# Patient Record
Sex: MALE | Employment: UNEMPLOYED | URBAN - METROPOLITAN AREA
[De-identification: names, ages, dates, MRNs, and addresses within clinical notes are randomized per-mention and may not be internally consistent; named-entity substitution may affect disease eponyms.]

---

## 2023-08-09 ENCOUNTER — OFFICE VISIT (OUTPATIENT)
Dept: SURGERY | Age: 48
End: 2023-08-09
Payer: OTHER GOVERNMENT

## 2023-08-09 VITALS
HEART RATE: 79 BPM | WEIGHT: 160 LBS | BODY MASS INDEX: 25.11 KG/M2 | OXYGEN SATURATION: 100 % | DIASTOLIC BLOOD PRESSURE: 80 MMHG | SYSTOLIC BLOOD PRESSURE: 124 MMHG | HEIGHT: 67 IN

## 2023-08-09 DIAGNOSIS — M54.42 CHRONIC LEFT-SIDED LOW BACK PAIN WITH LEFT-SIDED SCIATICA: ICD-10-CM

## 2023-08-09 DIAGNOSIS — R10.32 LEFT GROIN PAIN: ICD-10-CM

## 2023-08-09 DIAGNOSIS — G89.29 CHRONIC LEFT-SIDED LOW BACK PAIN WITH LEFT-SIDED SCIATICA: ICD-10-CM

## 2023-08-09 PROCEDURE — 99204 OFFICE O/P NEW MOD 45 MIN: CPT | Performed by: STUDENT IN AN ORGANIZED HEALTH CARE EDUCATION/TRAINING PROGRAM

## 2023-08-09 RX ORDER — CYCLOBENZAPRINE HCL 10 MG
TABLET ORAL
COMMUNITY
Start: 2021-08-19

## 2023-08-09 RX ORDER — BUPROPION HYDROCHLORIDE 150 MG/1
1 TABLET, EXTENDED RELEASE ORAL DAILY
Qty: 30 TABLET | Refills: 9 | COMMUNITY
Start: 2022-12-14 | End: 2023-10-08

## 2023-08-09 RX ORDER — ESCITALOPRAM OXALATE 20 MG/1
TABLET ORAL
COMMUNITY
Start: 2022-12-27 | End: 2023-12-28

## 2023-08-09 RX ORDER — CELECOXIB 100 MG/1
CAPSULE ORAL
COMMUNITY
Start: 2021-10-13

## 2023-08-09 RX ORDER — AMOXICILLIN 500 MG/1
TABLET, FILM COATED ORAL
COMMUNITY
Start: 2022-02-26

## 2023-08-09 RX ORDER — ARIPIPRAZOLE 5 MG/1
TABLET ORAL
COMMUNITY
Start: 2021-10-11

## 2023-08-09 RX ORDER — ATORVASTATIN CALCIUM 20 MG/1
TABLET, FILM COATED ORAL
COMMUNITY
Start: 2022-06-10

## 2023-08-09 RX ORDER — ALPRAZOLAM 0.5 MG/1
TABLET ORAL
COMMUNITY
Start: 2022-04-01

## 2023-08-29 ENCOUNTER — HOSPITAL ENCOUNTER (OUTPATIENT)
Dept: MRI IMAGING | Age: 48
Discharge: HOME | End: 2023-09-01
Attending: STUDENT IN AN ORGANIZED HEALTH CARE EDUCATION/TRAINING PROGRAM
Payer: OTHER GOVERNMENT

## 2023-08-29 DIAGNOSIS — G89.29 CHRONIC LEFT-SIDED LOW BACK PAIN WITH LEFT-SIDED SCIATICA: ICD-10-CM

## 2023-08-29 DIAGNOSIS — R10.32 LEFT GROIN PAIN: ICD-10-CM

## 2023-08-29 DIAGNOSIS — M54.42 CHRONIC LEFT-SIDED LOW BACK PAIN WITH LEFT-SIDED SCIATICA: ICD-10-CM

## 2023-08-29 PROCEDURE — A9579 GAD-BASE MR CONTRAST NOS,1ML: HCPCS | Performed by: STUDENT IN AN ORGANIZED HEALTH CARE EDUCATION/TRAINING PROGRAM

## 2023-08-29 PROCEDURE — 72197 MRI PELVIS W/O & W/DYE: CPT

## 2023-08-29 PROCEDURE — 6360000004 HC RX CONTRAST MEDICATION: Performed by: STUDENT IN AN ORGANIZED HEALTH CARE EDUCATION/TRAINING PROGRAM

## 2023-08-29 PROCEDURE — 2580000003 HC RX 258: Performed by: STUDENT IN AN ORGANIZED HEALTH CARE EDUCATION/TRAINING PROGRAM

## 2023-08-29 PROCEDURE — 72158 MRI LUMBAR SPINE W/O & W/DYE: CPT

## 2023-08-29 RX ORDER — SODIUM CHLORIDE 0.9 % (FLUSH) 0.9 %
10 SYRINGE (ML) INJECTION AS NEEDED
Status: DISCONTINUED | OUTPATIENT
Start: 2023-08-29 | End: 2023-09-02 | Stop reason: HOSPADM

## 2023-08-29 RX ADMIN — SODIUM CHLORIDE, PRESERVATIVE FREE 10 ML: 5 INJECTION INTRAVENOUS at 20:58

## 2023-08-29 RX ADMIN — GADOTERIDOL 14 ML: 279.3 INJECTION, SOLUTION INTRAVENOUS at 20:58

## 2023-08-31 DIAGNOSIS — M51.16 LUMBAR DISC DISEASE WITH RADICULOPATHY: Primary | ICD-10-CM

## 2023-08-31 NOTE — PROGRESS NOTES
Pt's MRI lumbar results discussed with the patient.  Will refer to spinal surgeon Dr. Pasquale Zamudio

## 2023-09-21 ENCOUNTER — OFFICE VISIT (OUTPATIENT)
Dept: ORTHOPEDIC SURGERY | Age: 48
End: 2023-09-21
Payer: OTHER GOVERNMENT

## 2023-09-21 VITALS — HEIGHT: 67 IN | BODY MASS INDEX: 25.99 KG/M2 | WEIGHT: 165.6 LBS

## 2023-09-21 DIAGNOSIS — M54.16 LUMBAR RADICULOPATHY: ICD-10-CM

## 2023-09-21 DIAGNOSIS — M51.36 LUMBAR DEGENERATIVE DISC DISEASE: ICD-10-CM

## 2023-09-21 DIAGNOSIS — M48.061 LUMBAR FORAMINAL STENOSIS: ICD-10-CM

## 2023-09-21 DIAGNOSIS — M54.50 LOW BACK PAIN, UNSPECIFIED BACK PAIN LATERALITY, UNSPECIFIED CHRONICITY, UNSPECIFIED WHETHER SCIATICA PRESENT: Primary | ICD-10-CM

## 2023-09-21 PROCEDURE — 99203 OFFICE O/P NEW LOW 30 MIN: CPT | Performed by: NURSE PRACTITIONER

## 2023-09-21 RX ORDER — ROSUVASTATIN CALCIUM 20 MG/1
TABLET, COATED ORAL
COMMUNITY
Start: 2021-08-24

## 2023-09-21 RX ORDER — ATORVASTATIN CALCIUM 40 MG/1
TABLET, FILM COATED ORAL
COMMUNITY
Start: 2023-09-08

## 2023-09-21 RX ORDER — METHOCARBAMOL 750 MG/1
TABLET, FILM COATED ORAL
COMMUNITY
Start: 2023-05-23 | End: 2024-05-23

## 2023-09-21 RX ORDER — RIZATRIPTAN BENZOATE 10 MG/1
TABLET, ORALLY DISINTEGRATING ORAL
COMMUNITY
Start: 2021-08-19 | End: 2023-11-11

## 2023-09-21 RX ORDER — TOPIRAMATE 25 MG/1
TABLET ORAL
COMMUNITY
Start: 2023-09-08 | End: 2024-09-08

## 2023-09-21 RX ORDER — GABAPENTIN 100 MG/1
100 CAPSULE ORAL
COMMUNITY
Start: 2023-07-31

## 2023-09-21 RX ORDER — IBUPROFEN 600 MG/1
TABLET ORAL
COMMUNITY
Start: 2021-08-19

## 2023-09-21 RX ORDER — LIDOCAINE 50 MG/G
PATCH TOPICAL
COMMUNITY
Start: 2021-08-19

## 2023-09-21 RX ORDER — DICLOFENAC SODIUM 75 MG/1
TABLET, DELAYED RELEASE ORAL
COMMUNITY
Start: 2021-11-29

## 2023-09-21 RX ORDER — BUSPIRONE HYDROCHLORIDE 15 MG/1
TABLET ORAL
COMMUNITY
Start: 2022-04-01 | End: 2024-03-22

## 2023-09-21 RX ORDER — FAMOTIDINE 20 MG/1
TABLET, FILM COATED ORAL
COMMUNITY
Start: 2022-07-08

## 2023-09-21 RX ORDER — TAMSULOSIN HYDROCHLORIDE 0.4 MG/1
CAPSULE ORAL
COMMUNITY
Start: 2022-08-03

## 2023-09-21 RX ORDER — FLUOXETINE HYDROCHLORIDE 40 MG/1
40 CAPSULE ORAL
COMMUNITY
Start: 2023-07-31

## 2023-09-21 RX ORDER — CETIRIZINE HYDROCHLORIDE 10 MG/1
TABLET ORAL
COMMUNITY
Start: 2023-05-23 | End: 2024-05-23

## 2023-09-21 RX ORDER — CLOTRIMAZOLE 1 %
CREAM (GRAM) TOPICAL
COMMUNITY
Start: 2023-09-08 | End: 2024-09-08

## 2023-09-21 RX ORDER — ACETAMINOPHEN 325 MG/1
TABLET ORAL
COMMUNITY
Start: 2023-08-21 | End: 2024-09-08

## 2023-09-21 RX ORDER — ZOLPIDEM TARTRATE 10 MG/1
TABLET ORAL
COMMUNITY
Start: 2021-08-19

## 2023-09-21 RX ORDER — FENOFIBRATE 145 MG/1
TABLET, COATED ORAL
COMMUNITY
Start: 2023-09-08

## 2023-09-21 NOTE — PROGRESS NOTES
AFFECTED AREA TWICE A DAY AS NEEDED ---EXTERNAL USE ONLY, Disp: , Rfl:     FLUoxetine (PROZAC) 40 MG capsule, 1 capsule, Disp: , Rfl:     gabapentin (NEURONTIN) 100 MG capsule, 1 capsule., Disp: , Rfl:     lidocaine (LIDODERM) 5 %, lidocaine 5% topical film Start Date: 8/26/21 Status: Ordered, Disp: , Rfl:     methocarbamol (ROBAXIN) 750 MG tablet, TAKE ONE TABLET BY MOUTH EVERY 8 HOURS AS NEEDED FOR MUSCLE SPASM *REPLACES BACLOFEN*, Disp: , Rfl:     rizatriptan (MAXALT-MLT) 10 MG disintegrating tablet, DISSOLVE 1 TABLET BY MOUTH ON THE TONGUE ONE TIME AT ONSET OF HEADACHE, MAY REPEAT IN 2 HOURS IF NO RELIEF *SWALLOW DISSOLVED TABLET WITH THE SALIVA*, Disp: , Rfl:     rosuvastatin (CRESTOR) 20 MG tablet, rosuvastatin 20 mg oral tablet Start Date: 8/29/21 Status: Ordered, Disp: , Rfl:     topiramate (TOPAMAX) 25 MG tablet, TAKE ONE TABLET BY MOUTH AT BEDTIME FOR 1 WEEK, THEN TAKE ONE TABLET TWICE A DAY FOR MIGRAINE PREVENTION, Disp: , Rfl:     tamsulosin (FLOMAX) 0.4 MG capsule, , Disp: , Rfl:     zolpidem (AMBIEN) 10 MG tablet, , Disp: , Rfl:     acetaminophen (TYLENOL) 325 MG tablet, TAKE TWO TABLETS BY MOUTH EVERY 8 HOURS AS NEEDED FOR FEVER AND PAIN *MAX 4000MG ACETAMINOPHEN PER DAY*, Disp: , Rfl:     diclofenac (VOLTAREN) 75 MG EC tablet, diclofenac sodium 75 mg oral delayed release tablet Start Date: 11/29/21 Status: Ordered, Disp: , Rfl:     famotidine (PEPCID) 20 MG tablet, , Disp: , Rfl:     ibuprofen (ADVIL;MOTRIN) 600 MG tablet, ibuprofen 600 mg oral tablet Start Date: 8/26/21 Status: Ordered, Disp: , Rfl:     ALPRAZolam (XANAX) 0.5 MG tablet, ALPRAZolam 0.5 mg oral tablet Start Date: 4/1/22 Status: Ordered, Disp: , Rfl:     amoxicillin (AMOXIL) 500 MG tablet,  0 total refill(s), Disp: , Rfl:     ARIPiprazole (ABILIFY) 5 MG tablet, ARIPiprazole 5 mg oral tablet Start Date: 10/14/21 Status: Ordered, Disp: , Rfl:     atorvastatin (LIPITOR) 20 MG tablet, , Disp: , Rfl:     buPROPion (WELLBUTRIN SR) 150 MG

## 2023-10-25 ENCOUNTER — EVALUATION (OUTPATIENT)
Age: 48
End: 2023-10-25
Payer: OTHER GOVERNMENT

## 2023-10-25 DIAGNOSIS — M54.16 LUMBAR RADICULOPATHY: ICD-10-CM

## 2023-10-25 DIAGNOSIS — G89.29 CHRONIC LEFT-SIDED LOW BACK PAIN WITHOUT SCIATICA: Primary | ICD-10-CM

## 2023-10-25 DIAGNOSIS — M25.60 JOINT STIFFNESS OF SPINE: ICD-10-CM

## 2023-10-25 DIAGNOSIS — M25.652 DECREASED RANGE OF MOTION OF BOTH HIPS: ICD-10-CM

## 2023-10-25 DIAGNOSIS — M25.852 HIP IMPINGEMENT SYNDROME, LEFT: ICD-10-CM

## 2023-10-25 DIAGNOSIS — M25.552 PAIN OF LEFT HIP: ICD-10-CM

## 2023-10-25 DIAGNOSIS — M25.651 DECREASED RANGE OF MOTION OF BOTH HIPS: ICD-10-CM

## 2023-10-25 DIAGNOSIS — Z74.09 DECREASED FUNCTIONAL MOBILITY AND ENDURANCE: ICD-10-CM

## 2023-10-25 DIAGNOSIS — M54.50 CHRONIC LEFT-SIDED LOW BACK PAIN WITHOUT SCIATICA: Primary | ICD-10-CM

## 2023-10-25 PROCEDURE — 97162 PT EVAL MOD COMPLEX 30 MIN: CPT

## 2023-10-25 PROCEDURE — 97140 MANUAL THERAPY 1/> REGIONS: CPT

## 2023-10-25 PROCEDURE — 97110 THERAPEUTIC EXERCISES: CPT

## 2023-10-25 NOTE — PROGRESS NOTES
limitations/modifications  Restricted recreational participation  Difficulty sleeping    Clinical decision making: moderate complexity with questionable prediction of expectations and future outcomes which may require adjustments to the POC. Prognosis: excellent   Benefits and precautions of treatment explained to patient. Jamarcus Treviño is a 57 y.o. male who presents to therapy today with evolving/changing clinical presentation (moderate complexity)  related to L side low back and hip pain. Patient demonstrates severe objective and functional deficits with instability: constant or intense symptoms with severe loss of ROM, strength, or ADLs  Patient is appropriate for Rehabilitation Management per the LBP Treatment Based Classification, with initial focus on Symptom Modulation and Movement Control. Pt would benefit from skilled physical therapy services to address the deficits noted above for return to prior level of function. PLAN OF CARE     Effective Dates: 10/25/2023 TO 1/24/2024 (90 days). Frequency/Duration: 2x/week for 60 Day(s)  Interventions may include but are not limited to: (76527) Therapeutic exercise to develop ROM, strength, endurance and flexibility  (52557) Therapeutic activities using dynamic activities to improve function  (61482) Manual therapy techniques to improve joint and/or soft tissue mobility, ROM, and function as well as helping to decrease pain/spasms and swelling  (54942/64273) Dry needling for the management of neuromusculoskeletal pain and movement impairment  Home exercise program (HEP) development  Modalities prn to address pain, spasms, and swelling: (64642) Electrical stimulation - attended    The referring physician has reviewed and approved this evaluation and plan of care as noted by the electronic signature attached to note.     GOALS     Short term goals to be met by 11/22/2023  (4 weeks):  Patient will demonstrate good recall of HEP requiring no more than minimal

## 2023-10-30 ENCOUNTER — TREATMENT (OUTPATIENT)
Age: 48
End: 2023-10-30
Payer: OTHER GOVERNMENT

## 2023-10-30 DIAGNOSIS — M25.651 DECREASED RANGE OF MOTION OF BOTH HIPS: ICD-10-CM

## 2023-10-30 DIAGNOSIS — M25.852 HIP IMPINGEMENT SYNDROME, LEFT: ICD-10-CM

## 2023-10-30 DIAGNOSIS — Z74.09 DECREASED FUNCTIONAL MOBILITY AND ENDURANCE: ICD-10-CM

## 2023-10-30 DIAGNOSIS — M25.652 DECREASED RANGE OF MOTION OF BOTH HIPS: ICD-10-CM

## 2023-10-30 DIAGNOSIS — M25.60 JOINT STIFFNESS OF SPINE: ICD-10-CM

## 2023-10-30 DIAGNOSIS — M25.552 PAIN OF LEFT HIP: ICD-10-CM

## 2023-10-30 DIAGNOSIS — M54.16 LUMBAR RADICULOPATHY: ICD-10-CM

## 2023-10-30 DIAGNOSIS — G89.29 CHRONIC LEFT-SIDED LOW BACK PAIN WITHOUT SCIATICA: Primary | ICD-10-CM

## 2023-10-30 DIAGNOSIS — M54.50 CHRONIC LEFT-SIDED LOW BACK PAIN WITHOUT SCIATICA: Primary | ICD-10-CM

## 2023-10-30 PROCEDURE — 97140 MANUAL THERAPY 1/> REGIONS: CPT

## 2023-10-30 PROCEDURE — 97110 THERAPEUTIC EXERCISES: CPT

## 2023-10-30 NOTE — PROGRESS NOTES
[facility]  [address] [place] [address]  Dept: [phone]      Physical Therapy Daily Note     \"Avery\"    Referring MD: DENILSON Fernandez*  Diagnosis:     ICD-10-CM    1. Chronic left-sided low back pain without sciatica  M54.50     G89.29       2. Lumbar radiculopathy  M54.16       3. Pain of left hip  M25.552       4. Hip impingement syndrome, left  M25.852       5. Joint stiffness of spine  M25.60       6. Decreased range of motion of both hips  M25.651     M25.652       7. Decreased functional mobility and endurance  Z74.09          Surgery: n/a    Therapy precautions:None  Co-morbidities affecting plan of care: Hx of B hernia repair, HTN    PERTINENT MEDICAL HISTORY     Past medical and surgical history:   No past medical history on file. Past Surgical History:   Procedure Laterality Date    APPENDECTOMY      HERNIA REPAIR Bilateral     right hernia repaired 2 times, left inguinal hernia     Medications: reviewed in chart   Allergies: No Known Allergies     Chief complaints/history of injury: Patient reports long standing history of low back pain, with an original injury occurring when falling from helicopter in 1299. He notes his symptoms are mostly manageable, but he felt a tweak in the back when weightlifting in March while performing a UUE lower body lift. He has increased pain with paresthesia to the knee, occasionally to the foot, with flexed postures, especially with sitting and stair management. Date symptoms began: March 2023  Nature of condition: Recurrent (multiple episodes of < 3 months)  Primary cause of current episode: Repetitive  How did symptoms start: see above  Describe current symptoms: Pain at L LSJ, isolated in groin, paresthesia with flexed postures    Received previous therapy?  Yes; Number of therapy visits in the last 90 days: 0    Pain Assessment:  Pain location: L LSJ and L groin    Current (0-10 pain scale): 6/10  Average Pain/symptom

## 2023-11-08 ENCOUNTER — TREATMENT (OUTPATIENT)
Age: 48
End: 2023-11-08

## 2023-11-08 DIAGNOSIS — M25.60 JOINT STIFFNESS OF SPINE: ICD-10-CM

## 2023-11-08 DIAGNOSIS — M54.50 CHRONIC LEFT-SIDED LOW BACK PAIN WITHOUT SCIATICA: Primary | ICD-10-CM

## 2023-11-08 DIAGNOSIS — M25.651 DECREASED RANGE OF MOTION OF BOTH HIPS: ICD-10-CM

## 2023-11-08 DIAGNOSIS — M25.552 PAIN OF LEFT HIP: ICD-10-CM

## 2023-11-08 DIAGNOSIS — Z74.09 DECREASED FUNCTIONAL MOBILITY AND ENDURANCE: ICD-10-CM

## 2023-11-08 DIAGNOSIS — M25.652 DECREASED RANGE OF MOTION OF BOTH HIPS: ICD-10-CM

## 2023-11-08 DIAGNOSIS — M54.16 LUMBAR RADICULOPATHY: ICD-10-CM

## 2023-11-08 DIAGNOSIS — M25.852 HIP IMPINGEMENT SYNDROME, LEFT: ICD-10-CM

## 2023-11-08 DIAGNOSIS — G89.29 CHRONIC LEFT-SIDED LOW BACK PAIN WITHOUT SCIATICA: Primary | ICD-10-CM

## 2023-11-08 NOTE — PROGRESS NOTES
[facility]  [address] [place] [address]  Dept: [phone]      Physical Therapy Daily Note     \"Avery\"    Referring MD: DENILSON Corbett*  Diagnosis:     ICD-10-CM    1. Chronic left-sided low back pain without sciatica  M54.50     G89.29       2. Lumbar radiculopathy  M54.16       3. Pain of left hip  M25.552       4. Hip impingement syndrome, left  M25.852       5. Joint stiffness of spine  M25.60       6. Decreased range of motion of both hips  M25.651     M25.652       7. Decreased functional mobility and endurance  Z74.09          Surgery: n/a    Therapy precautions:None  Co-morbidities affecting plan of care: Hx of B hernia repair, HTN    PERTINENT MEDICAL HISTORY     Past medical and surgical history:   No past medical history on file. Past Surgical History:   Procedure Laterality Date    APPENDECTOMY      HERNIA REPAIR Bilateral     right hernia repaired 2 times, left inguinal hernia     Medications: reviewed in chart   Allergies: No Known Allergies     Chief complaints/history of injury: Patient reports long standing history of low back pain, with an original injury occurring when falling from helicopter in [facility]. He notes his symptoms are mostly manageable, but he felt a tweak in the back when weightlifting in March while performing a UUE lower body lift. He has increased pain with paresthesia to the knee, occasionally to the foot, with flexed postures, especially with sitting and stair management. Date symptoms began: March 2023  Nature of condition: Recurrent (multiple episodes of < 3 months)  Primary cause of current episode: Repetitive  How did symptoms start: see above  Describe current symptoms: Pain at L LSJ, isolated in groin, paresthesia with flexed postures    Received previous therapy?  Yes; Number of therapy visits in the last 90 days: 0    Pain Assessment:  Pain location: L LSJ and L groin    Current (0-10 pain scale): 6/10  Average Pain/symptom

## 2023-11-14 ENCOUNTER — TREATMENT (OUTPATIENT)
Age: 48
End: 2023-11-14
Payer: OTHER GOVERNMENT

## 2023-11-14 DIAGNOSIS — M54.16 LUMBAR RADICULOPATHY: ICD-10-CM

## 2023-11-14 DIAGNOSIS — M25.552 PAIN OF LEFT HIP: ICD-10-CM

## 2023-11-14 DIAGNOSIS — G89.29 CHRONIC LEFT-SIDED LOW BACK PAIN WITHOUT SCIATICA: Primary | ICD-10-CM

## 2023-11-14 DIAGNOSIS — M25.852 HIP IMPINGEMENT SYNDROME, LEFT: ICD-10-CM

## 2023-11-14 DIAGNOSIS — Z74.09 DECREASED FUNCTIONAL MOBILITY AND ENDURANCE: ICD-10-CM

## 2023-11-14 DIAGNOSIS — M25.652 DECREASED RANGE OF MOTION OF BOTH HIPS: ICD-10-CM

## 2023-11-14 DIAGNOSIS — M25.60 JOINT STIFFNESS OF SPINE: ICD-10-CM

## 2023-11-14 DIAGNOSIS — M54.50 CHRONIC LEFT-SIDED LOW BACK PAIN WITHOUT SCIATICA: Primary | ICD-10-CM

## 2023-11-14 DIAGNOSIS — M25.651 DECREASED RANGE OF MOTION OF BOTH HIPS: ICD-10-CM

## 2023-11-14 PROCEDURE — 97530 THERAPEUTIC ACTIVITIES: CPT

## 2023-11-14 PROCEDURE — 97110 THERAPEUTIC EXERCISES: CPT

## 2023-11-14 PROCEDURE — 97140 MANUAL THERAPY 1/> REGIONS: CPT

## 2023-11-14 NOTE — PROGRESS NOTES
dynamic activities to improve function  (13718) Manual therapy techniques to improve joint and/or soft tissue mobility, ROM, and function as well as helping to decrease pain/spasms and swelling  (90673/91623) Dry needling for the management of neuromusculoskeletal pain and movement impairment  Home exercise program (HEP) development  Modalities prn to address pain, spasms, and swelling: (82214) Electrical stimulation - attended    GOALS     Short term goals to be met by 11/22/2023  (4 weeks):  Patient will demonstrate good recall of HEP requiring no more than minimal verbal cuing for proper form and technique. Goal Met 11/8/2023  Pt will report symptoms have centralized to no farther than L trochanter, indicating nerve healing and improving pain levels and muscle activation. Patient to subjectively report a decrease in pain to 3/10 at worst to allow patient to increase function with ADLs. Pt will report pain decreased to intermittent for improved ability to participate in ADLs and exercise activities. Pt will demonstrate full, pain free AROM of lumbar spine to improve ability to perform functional activities. Pt will test negatively on SLR, indicating nerve healing    Long term goals to be met by 12/20/2023  (8 weeks):  Pt will report symptoms have centralized to no farther than LSJ, indicating nerve healing and improving pain levels and muscle activation. Pt will achieve functional hip AROM to perform functional activities and recreation Goal Met 11/14/2023  Pt will increase hip strength with MMT to at least 5/5 for improved load tolerance and recruitment in functional movements. Pt will test negatively for hip pathology, indicating appropriate muscular tone and tolerance of chosen activities. Pt will improve score on the Oswestry Low Back Pain Questionnaire to </= 20 % disability, demonstrating improved overall function. [employer]  Access Code: [email or web address]  URL: [email or web address]  Date:

## 2023-11-22 ENCOUNTER — TREATMENT (OUTPATIENT)
Age: 48
End: 2023-11-22

## 2023-11-22 DIAGNOSIS — Z74.09 DECREASED FUNCTIONAL MOBILITY AND ENDURANCE: ICD-10-CM

## 2023-11-22 DIAGNOSIS — M54.16 LUMBAR RADICULOPATHY: ICD-10-CM

## 2023-11-22 DIAGNOSIS — M25.652 DECREASED RANGE OF MOTION OF BOTH HIPS: ICD-10-CM

## 2023-11-22 DIAGNOSIS — M25.651 DECREASED RANGE OF MOTION OF BOTH HIPS: ICD-10-CM

## 2023-11-22 DIAGNOSIS — M25.60 JOINT STIFFNESS OF SPINE: ICD-10-CM

## 2023-11-22 DIAGNOSIS — M54.50 CHRONIC LEFT-SIDED LOW BACK PAIN WITHOUT SCIATICA: Primary | ICD-10-CM

## 2023-11-22 DIAGNOSIS — M25.552 PAIN OF LEFT HIP: ICD-10-CM

## 2023-11-22 DIAGNOSIS — M25.852 HIP IMPINGEMENT SYNDROME, LEFT: ICD-10-CM

## 2023-11-22 DIAGNOSIS — G89.29 CHRONIC LEFT-SIDED LOW BACK PAIN WITHOUT SCIATICA: Primary | ICD-10-CM

## 2023-11-22 NOTE — PROGRESS NOTES
points, muscular and connective tissues for the management of neuromusculoskeletal pain and movement impairment   Patient was educated on dry needling treatment, expectations, and post-treatment instructions. Dry needling precautions/contraindications: Reviewed- none noted    Needles size and location: .[id number], L lumbar paraspinals (L1/4)                                             Needle count: 2 needles inserted/ 2 needles removed   Position: prone   Needle  technique left in situ x 10 minutes    Electrical Stimulation Performed with (39935) attended electrical stimulation, Frequency: 45 Hz, and Number of leads: 2   Patient Response: Patient experienced no adverse response to treatment. Pre-Test: See above   Post-Test: Normalized muscular tone, full extension, alleviation of symptoms       Therapeutic activities (77723) x 15 min using dynamic activities to improve function  Squat  Split (Belarusian, TRX long) 2x8 each  Deadlift  Augustin curl (6\" box) 2x10 (25lbs)      Patient Education  Use of TpDN with eStim and expected response  HEP update with functional motions  Briana split squat  Augustin curl    ASSESSMENT     Patient demonstrates:  Progression of lumbar and hip ROM, with min restriction in extension and L sidebending, but no in pain in all planes  Hypertonic lumbar musculature detailed above     He has improvements in:  Extension ROM, muscular tone, and symptom profile s/p TpDN  Load tolerance, with  Progression of functional motions     Continues with:  Deficits in hip mobility, motor control, and functional tolerance  appropriate for Rehabilitation Management per the LBP Treatment Based Classification, with focus on Symptom Modulation and Functional Optimization    PLAN     Continue therapy per POC. Manual treatment for symptom modulation and restoration of hip and thoracolumbar ROM.  Progress activities for engagement of lumbopelvic musculature with integration to functional tasks as

## 2023-11-29 ENCOUNTER — TREATMENT (OUTPATIENT)
Age: 48
End: 2023-11-29
Payer: OTHER GOVERNMENT

## 2023-11-29 DIAGNOSIS — M25.852 HIP IMPINGEMENT SYNDROME, LEFT: ICD-10-CM

## 2023-11-29 DIAGNOSIS — M54.50 CHRONIC LEFT-SIDED LOW BACK PAIN WITHOUT SCIATICA: Primary | ICD-10-CM

## 2023-11-29 DIAGNOSIS — M25.552 PAIN OF LEFT HIP: ICD-10-CM

## 2023-11-29 DIAGNOSIS — Z74.09 DECREASED FUNCTIONAL MOBILITY AND ENDURANCE: ICD-10-CM

## 2023-11-29 DIAGNOSIS — M25.652 DECREASED RANGE OF MOTION OF BOTH HIPS: ICD-10-CM

## 2023-11-29 DIAGNOSIS — M25.60 JOINT STIFFNESS OF SPINE: ICD-10-CM

## 2023-11-29 DIAGNOSIS — G89.29 CHRONIC LEFT-SIDED LOW BACK PAIN WITHOUT SCIATICA: Primary | ICD-10-CM

## 2023-11-29 DIAGNOSIS — M54.16 LUMBAR RADICULOPATHY: ICD-10-CM

## 2023-11-29 DIAGNOSIS — M25.651 DECREASED RANGE OF MOTION OF BOTH HIPS: ICD-10-CM

## 2023-11-29 PROCEDURE — 97032 APPL MODALITY 1+ESTIM EA 15: CPT

## 2023-11-29 PROCEDURE — 20560 NDL INSJ W/O NJX 1 OR 2 MUSC: CPT

## 2023-11-29 PROCEDURE — 97110 THERAPEUTIC EXERCISES: CPT

## 2023-11-29 PROCEDURE — 97140 MANUAL THERAPY 1/> REGIONS: CPT

## 2023-11-29 NOTE — PROGRESS NOTES
box)  Lunge  Lateral      Patient Education  Use of TpDN with eStim and expected response  Progress to date, separation of symptoms  HEP update with functional motions  Lateral step  Lateral lunge    ASSESSMENT     Start of care: 10/25/2023   As of 11/29/2023, Terri Crisostomo has attended 6 PT sessions. Pt's attendance has been consistent with plan of care. Pt has progressed well with treatment. He has met 5/6 short term goals, 1/5 long term goals, has subjective reports of isolation of symptoms to distinct areas at the L LSJ and L anterior hip/groin, but improvement in tolerance movement, with unrestricted ADLs, only mild discomfort with extended duration postures, and return to resistance training for exercise, and has improved functional deficit to 40% per JAZMYNE. Objective findings revealed full and pain free lumbar ROM with only sense of tightness in L anterior hip during R quadrant, progression of strength to 4+/5 or greater with MMT globally, functional hip ROM, negative SLR, but positive medial scour and FADIR on L. Continued deficits include: Pain, ROM limitations, Soft tissue restrictions, Strength deficits, Motor control deficits, Decreased endurance/activity Tolerance, and Restricted recreational participation. Pt demonstrates mild or moderate objective and functional deficits without instability: sporadic symptoms with min to mod loss of ROM, strength, or ADLs. Pt has not achieved max rehab potential and would benefit from continued skilled PT to address the above impairments and continue progress towards remaining therapy goals. Pt has not achieved max rehab potential and would benefit from continued skilled PT to address the above impairments and continue progress towards remaining therapy goals. PLAN     Effective Dates/Duration: 11/29/2023 TO 1/28/2024 (60 days).     Frequency: 1x/week   Interventions may include but are not limited to: (44125) Therapeutic exercise to develop ROM, strength,

## 2023-12-13 ENCOUNTER — CLINICAL DOCUMENTATION (OUTPATIENT)
Age: 48
End: 2023-12-13

## 2023-12-13 NOTE — PROGRESS NOTES
Pt called main number this morning to cancel today's PT appointment. Had \"a procedure\" done at [facility] and is in too much pain to attend PT.

## 2023-12-27 ENCOUNTER — TREATMENT (OUTPATIENT)
Age: 48
End: 2023-12-27
Payer: OTHER GOVERNMENT

## 2023-12-27 DIAGNOSIS — M54.50 CHRONIC LEFT-SIDED LOW BACK PAIN WITHOUT SCIATICA: Primary | ICD-10-CM

## 2023-12-27 DIAGNOSIS — M25.60 JOINT STIFFNESS OF SPINE: ICD-10-CM

## 2023-12-27 DIAGNOSIS — M25.652 DECREASED RANGE OF MOTION OF BOTH HIPS: ICD-10-CM

## 2023-12-27 DIAGNOSIS — M25.651 DECREASED RANGE OF MOTION OF BOTH HIPS: ICD-10-CM

## 2023-12-27 DIAGNOSIS — Z74.09 DECREASED FUNCTIONAL MOBILITY AND ENDURANCE: ICD-10-CM

## 2023-12-27 DIAGNOSIS — M54.16 LUMBAR RADICULOPATHY: ICD-10-CM

## 2023-12-27 DIAGNOSIS — M25.552 PAIN OF LEFT HIP: ICD-10-CM

## 2023-12-27 DIAGNOSIS — G89.29 CHRONIC LEFT-SIDED LOW BACK PAIN WITHOUT SCIATICA: Primary | ICD-10-CM

## 2023-12-27 PROCEDURE — 97140 MANUAL THERAPY 1/> REGIONS: CPT

## 2023-12-27 PROCEDURE — 97530 THERAPEUTIC ACTIVITIES: CPT

## 2023-12-27 PROCEDURE — 97110 THERAPEUTIC EXERCISES: CPT

## 2023-12-27 NOTE — PROGRESS NOTES
[facility]  [address] [place] [address]  Dept: [phone]      Physical Therapy Daily Note     \"Avery\"    Referring MD: DENILSON Jo*  Diagnosis:     ICD-10-CM    1. Chronic left-sided low back pain without sciatica  M54.50     G89.29       2. Pain of left hip  M25.552       3. Joint stiffness of spine  M25.60       4. Decreased range of motion of both hips  M25.651     M25.652       5. Decreased functional mobility and endurance  Z74.09       6. Lumbar radiculopathy  M54.16          Surgery: n/a    Therapy precautions:None  Co-morbidities affecting plan of care: Hx of B hernia repair, HTN    PERTINENT MEDICAL HISTORY     Past medical and surgical history:   No past medical history on file. Past Surgical History:   Procedure Laterality Date    APPENDECTOMY      HERNIA REPAIR Bilateral     right hernia repaired 2 times, left inguinal hernia     Medications: reviewed in chart   Allergies: No Known Allergies     Chief complaints/history of injury: Patient reports long standing history of low back pain, with an original injury occurring when falling from helicopter in [address]6. He notes his symptoms are mostly manageable, but he felt a tweak in the back when weightlifting in March while performing a UUE lower body lift. He has increased pain with paresthesia to the knee, occasionally to the foot, with flexed postures, especially with sitting and stair management. PN (11/29/23)  Lattie Reveal of condition: Chronic (continuous duration > 3 months)  Describe current symptoms: L side lumbar tightness/discomfort, L anterior hip/groin pain    Pain Assessment:  Pain location: L side lumbar, L anterior hip/groin    Average Pain/symptom intensity (0-10 scale)  Last 24 hours: 1/10  Last week (1-7 days): 2/10  How often do you feel symptoms? Frequently (51-75%)    How much have your symptoms interfered with daily activities?  A little bit  How has your condition changed since receiving care

## 2024-01-03 ENCOUNTER — TREATMENT (OUTPATIENT)
Age: 49
End: 2024-01-03

## 2024-01-03 DIAGNOSIS — M25.652 DECREASED RANGE OF MOTION OF BOTH HIPS: ICD-10-CM

## 2024-01-03 DIAGNOSIS — G89.29 CHRONIC LEFT-SIDED LOW BACK PAIN WITHOUT SCIATICA: Primary | ICD-10-CM

## 2024-01-03 DIAGNOSIS — M25.60 JOINT STIFFNESS OF SPINE: ICD-10-CM

## 2024-01-03 DIAGNOSIS — M54.50 CHRONIC LEFT-SIDED LOW BACK PAIN WITHOUT SCIATICA: Primary | ICD-10-CM

## 2024-01-03 DIAGNOSIS — Z74.09 DECREASED FUNCTIONAL MOBILITY AND ENDURANCE: ICD-10-CM

## 2024-01-03 DIAGNOSIS — M25.552 PAIN OF LEFT HIP: ICD-10-CM

## 2024-01-03 DIAGNOSIS — M25.651 DECREASED RANGE OF MOTION OF BOTH HIPS: ICD-10-CM

## 2024-01-03 NOTE — PROGRESS NOTES
[facility]  [address] [facility] [address]  Dept: [phone]      Physical Therapy Progress Report     \"Avery\"    Referring MD: Yoandy Beckett, DENILSON*  Diagnosis:     ICD-10-CM    1. Chronic left-sided low back pain without sciatica  M54.50     G89.29       2. Pain of left hip  M25.552       3. Joint stiffness of spine  M25.60       4. Decreased range of motion of both hips  M25.651     M25.652       5. Decreased functional mobility and endurance  Z74.09          Surgery: n/a    Therapy precautions:None  Co-morbidities affecting plan of care: Hx of B hernia repair, HTN    PERTINENT MEDICAL HISTORY     Past medical and surgical history:   No past medical history on file.   Past Surgical History:   Procedure Laterality Date    APPENDECTOMY      HERNIA REPAIR Bilateral     right hernia repaired 2 times, left inguinal hernia     Medications: reviewed in chart   Allergies: No Known Allergies     Chief complaints/history of injury: Patient reports long standing history of low back pain, with an original injury occurring when falling from helicopter in 2003. He notes his symptoms are mostly manageable, but he felt a tweak in the back when weightlifting in March while performing a UUE lower body lift. He has increased pain with paresthesia to the knee, occasionally to the foot, with flexed postures, especially with sitting and stair management.    PN (11/29/23)  Nature of condition: Chronic (continuous duration > 3 months)  Describe current symptoms: L side lumbar tightness/discomfort, L anterior hip/groin pain    Pain Assessment:  Pain location: L side lumbar, L anterior hip/groin    Average Pain/symptom intensity (0-10 scale)  Last 24 hours: 1/10  Last week (1-7 days): 2/10  How often do you feel symptoms? Frequently (51-75%)    How much have your symptoms interfered with daily activities? A little bit  How has your condition changed since receiving care at this facility? Much

## 2024-01-17 ENCOUNTER — TREATMENT (OUTPATIENT)
Age: 49
End: 2024-01-17
Payer: OTHER GOVERNMENT

## 2024-01-17 DIAGNOSIS — M54.50 CHRONIC LEFT-SIDED LOW BACK PAIN WITHOUT SCIATICA: Primary | ICD-10-CM

## 2024-01-17 DIAGNOSIS — G89.29 CHRONIC LEFT-SIDED LOW BACK PAIN WITHOUT SCIATICA: Primary | ICD-10-CM

## 2024-01-17 DIAGNOSIS — Z74.09 DECREASED FUNCTIONAL MOBILITY AND ENDURANCE: ICD-10-CM

## 2024-01-17 DIAGNOSIS — M25.552 PAIN OF LEFT HIP: ICD-10-CM

## 2024-01-17 DIAGNOSIS — M25.651 DECREASED RANGE OF MOTION OF BOTH HIPS: ICD-10-CM

## 2024-01-17 DIAGNOSIS — M25.60 JOINT STIFFNESS OF SPINE: ICD-10-CM

## 2024-01-17 DIAGNOSIS — M25.652 DECREASED RANGE OF MOTION OF BOTH HIPS: ICD-10-CM

## 2024-01-17 DIAGNOSIS — M25.852 HIP IMPINGEMENT SYNDROME, LEFT: ICD-10-CM

## 2024-01-17 PROCEDURE — 97110 THERAPEUTIC EXERCISES: CPT

## 2024-01-17 PROCEDURE — 97140 MANUAL THERAPY 1/> REGIONS: CPT

## 2024-01-17 NOTE — PROGRESS NOTES
Interventions may include but are not limited to: (43262) Therapeutic exercise to develop ROM, strength, endurance and flexibility  (34433) Therapeutic activities using dynamic activities to improve function  (66750) Manual therapy techniques to improve joint and/or soft tissue mobility, ROM, and function as well as helping to decrease pain/spasms and swelling  (20560/20561) Dry needling for the management of neuromusculoskeletal pain and movement impairment  Home exercise program (HEP) development  Modalities prn to address pain, spasms, and swelling: (08472) Electrical stimulation - attended    GOALS     Short term goals to be met by 11/22/2023  (4 weeks):  Patient will demonstrate good recall of HEP requiring no more than minimal verbal cuing for proper form and technique. Goal Met 11/8/2023  Pt will report symptoms have centralized to no farther than L trochanter, indicating nerve healing and improving pain levels and muscle activation. Goal Met 11/22/2023  Patient to subjectively report a decrease in pain to 3/10 at worst to allow patient to increase function with ADLs. Goal Met 11/29/2023  Pt will report pain decreased to intermittent for improved ability to participate in ADLs and exercise activities. Goal Not Met 11/29/2023 - Continue  Pt will demonstrate full, pain free AROM of lumbar spine to improve ability to perform functional activities.Goal Met 11/29/2023  Pt will test negatively on SLR, indicating nerve healing Goal Met 11/29/2023    Long term goals to be met by 12/20/2023  (8 weeks):  Pt will report symptoms have centralized to no farther than LSJ, indicating nerve healing and improving pain levels and muscle activation. Goal Met 12/20/2023  Pt will achieve functional hip AROM to perform functional activities and recreation Goal Met 11/14/2023    Short term goals to be met by 12/27/2023  (4 weeks):  Pt will report pain decreased to intermittent for improved ability to participate in ADLs and

## 2024-01-24 ENCOUNTER — APPOINTMENT (OUTPATIENT)
Dept: CT IMAGING | Age: 49
End: 2024-01-24
Payer: OTHER GOVERNMENT

## 2024-01-24 ENCOUNTER — HOSPITAL ENCOUNTER (EMERGENCY)
Age: 49
Discharge: HOME | End: 2024-01-24
Payer: OTHER GOVERNMENT

## 2024-01-24 VITALS
HEIGHT: 67 IN | DIASTOLIC BLOOD PRESSURE: 76 MMHG | TEMPERATURE: 97.8 F | HEART RATE: 67 BPM | SYSTOLIC BLOOD PRESSURE: 116 MMHG | RESPIRATION RATE: 16 BRPM | WEIGHT: 165 LBS | BODY MASS INDEX: 25.9 KG/M2 | OXYGEN SATURATION: 100 %

## 2024-01-24 DIAGNOSIS — R10.32 LEFT INGUINAL PAIN: Primary | ICD-10-CM

## 2024-01-24 LAB
ALBUMIN SERPL-MCNC: 4.3 G/DL (ref 3.5–5)
ALBUMIN/GLOB SERPL: 1.2 (ref 0.4–1.6)
ALP SERPL-CCNC: 65 U/L (ref 50–136)
ALT SERPL-CCNC: 88 U/L (ref 12–65)
ANION GAP SERPL CALC-SCNC: 0 MMOL/L (ref 2–11)
AST SERPL-CCNC: 33 U/L (ref 15–37)
BASOPHILS # BLD: 0.1 K/UL (ref 0–0.2)
BASOPHILS NFR BLD: 1 % (ref 0–2)
BILIRUB SERPL-MCNC: 0.7 MG/DL (ref 0.2–1.1)
BUN SERPL-MCNC: 16 MG/DL (ref 6–23)
CALCIUM SERPL-MCNC: 9.7 MG/DL (ref 8.3–10.4)
CHLORIDE SERPL-SCNC: 106 MMOL/L (ref 103–113)
CO2 SERPL-SCNC: 32 MMOL/L (ref 21–32)
CREAT SERPL-MCNC: 1 MG/DL (ref 0.8–1.5)
DIFFERENTIAL METHOD BLD: ABNORMAL
EOSINOPHIL # BLD: 0.2 K/UL (ref 0–0.8)
EOSINOPHIL NFR BLD: 4 % (ref 0.5–7.8)
ERYTHROCYTE [DISTWIDTH] IN BLOOD BY AUTOMATED COUNT: 12.1 % (ref 11.9–14.6)
GLOBULIN SER CALC-MCNC: 3.7 G/DL (ref 2.8–4.5)
GLUCOSE SERPL-MCNC: 104 MG/DL (ref 65–100)
HCT VFR BLD AUTO: 44 % (ref 41.1–50.3)
HGB BLD-MCNC: 14.7 G/DL (ref 13.6–17.2)
IMM GRANULOCYTES # BLD AUTO: 0 K/UL (ref 0–0.5)
IMM GRANULOCYTES NFR BLD AUTO: 0 % (ref 0–5)
LIPASE SERPL-CCNC: 120 U/L (ref 73–393)
LYMPHOCYTES # BLD: 1.1 K/UL (ref 0.5–4.6)
LYMPHOCYTES NFR BLD: 23 % (ref 13–44)
MCH RBC QN AUTO: 29.5 PG (ref 26.1–32.9)
MCHC RBC AUTO-ENTMCNC: 33.4 G/DL (ref 31.4–35)
MCV RBC AUTO: 88.4 FL (ref 82–102)
MONOCYTES # BLD: 0.2 K/UL (ref 0.1–1.3)
MONOCYTES NFR BLD: 4 % (ref 4–12)
NEUTS SEG # BLD: 3.3 K/UL (ref 1.7–8.2)
NEUTS SEG NFR BLD: 68 % (ref 43–78)
NRBC # BLD: 0 K/UL (ref 0–0.2)
PLATELET # BLD AUTO: 226 K/UL (ref 150–450)
PMV BLD AUTO: 9.1 FL (ref 9.4–12.3)
POTASSIUM SERPL-SCNC: 4.7 MMOL/L (ref 3.5–5.1)
PROT SERPL-MCNC: 8 G/DL (ref 6.3–8.2)
RBC # BLD AUTO: 4.98 M/UL (ref 4.23–5.6)
SODIUM SERPL-SCNC: 138 MMOL/L (ref 136–146)
WBC # BLD AUTO: 4.8 K/UL (ref 4.3–11.1)

## 2024-01-24 PROCEDURE — 99285 EMERGENCY DEPT VISIT HI MDM: CPT

## 2024-01-24 PROCEDURE — 6360000004 HC RX CONTRAST MEDICATION: Performed by: STUDENT IN AN ORGANIZED HEALTH CARE EDUCATION/TRAINING PROGRAM

## 2024-01-24 PROCEDURE — 80053 COMPREHEN METABOLIC PANEL: CPT

## 2024-01-24 PROCEDURE — 85025 COMPLETE CBC W/AUTO DIFF WBC: CPT

## 2024-01-24 PROCEDURE — 74177 CT ABD & PELVIS W/CONTRAST: CPT

## 2024-01-24 PROCEDURE — 83690 ASSAY OF LIPASE: CPT

## 2024-01-24 RX ADMIN — IOPAMIDOL 100 ML: 755 INJECTION, SOLUTION INTRAVENOUS at 13:41

## 2024-01-24 ASSESSMENT — ENCOUNTER SYMPTOMS
SHORTNESS OF BREATH: 0
TROUBLE SWALLOWING: 0
COUGH: 0
ABDOMINAL PAIN: 1
VOMITING: 0
PHOTOPHOBIA: 0
FACIAL SWELLING: 0

## 2024-01-24 ASSESSMENT — PAIN DESCRIPTION - LOCATION: LOCATION: ABDOMEN;GROIN

## 2024-01-24 ASSESSMENT — LIFESTYLE VARIABLES
HOW MANY STANDARD DRINKS CONTAINING ALCOHOL DO YOU HAVE ON A TYPICAL DAY: 1 OR 2
HOW OFTEN DO YOU HAVE A DRINK CONTAINING ALCOHOL: MONTHLY OR LESS

## 2024-01-24 ASSESSMENT — PAIN - FUNCTIONAL ASSESSMENT: PAIN_FUNCTIONAL_ASSESSMENT: 0-10

## 2024-01-24 ASSESSMENT — PAIN SCALES - GENERAL: PAINLEVEL_OUTOF10: 7

## 2024-01-24 NOTE — ED TRIAGE NOTES
Patient presents ambulatory to triage with c/o abdominal and groin pain.  Patient states that he went to the doctor earlier for his herniated disc who referred him to ER for possible kidney stone r/t pain. Pt has copy of x-ray on his phone which MD was concerned may show possible kidney stone.

## 2024-01-24 NOTE — ED NOTES
I have reviewed discharge instructions with the patient and spouse.  The patient and spouse verbalized understanding.    Patient left ED via Discharge Method: ambulatory to Home with spouse.    Opportunity for questions and clarification provided.       Patient given 0 scripts.         To continue your aftercare when you leave the hospital, you may receive an automated call from our care team to check in on how you are doing.  This is a free service and part of our promise to provide the best care and service to meet your aftercare needs.” If you have questions, or wish to unsubscribe from this service please call [phone].  Thank you for Choosing our [facility] Emergency Department.

## 2024-01-24 NOTE — DISCHARGE INSTRUCTIONS
Your CT scan today showed multiple phleboliths which was likely what your doctor saw on x-ray.  These do not cause pain.  Please continue [facility] with [facility] regarding your [facility].  Return here for new or symptoms worsening symptoms.    As we discussed, I did not find a life threatening cause of your symptoms today. However, THAT DOES NOT MEAN IT COULD NOT DEVELOP. If you develop ANY new or worsening symptoms, it is critical that you return for re-evaluation. This includes any symptoms that are concerning to you, especially symptoms such as difficulty urinating, testicular pain or swelling, leg pain or swelling, fevers.  If you do not return for re-evaluation, you risk serious complications, including death.

## 2024-01-30 ENCOUNTER — TREATMENT (OUTPATIENT)
Age: 49
End: 2024-01-30
Payer: OTHER GOVERNMENT

## 2024-01-30 DIAGNOSIS — G89.29 CHRONIC LEFT-SIDED LOW BACK PAIN WITHOUT SCIATICA: Primary | ICD-10-CM

## 2024-01-30 DIAGNOSIS — M25.651 DECREASED RANGE OF MOTION OF BOTH HIPS: ICD-10-CM

## 2024-01-30 DIAGNOSIS — M25.60 JOINT STIFFNESS OF SPINE: ICD-10-CM

## 2024-01-30 DIAGNOSIS — M25.552 PAIN OF LEFT HIP: ICD-10-CM

## 2024-01-30 DIAGNOSIS — M25.652 DECREASED RANGE OF MOTION OF BOTH HIPS: ICD-10-CM

## 2024-01-30 DIAGNOSIS — Z74.09 DECREASED FUNCTIONAL MOBILITY AND ENDURANCE: ICD-10-CM

## 2024-01-30 DIAGNOSIS — M54.50 CHRONIC LEFT-SIDED LOW BACK PAIN WITHOUT SCIATICA: Primary | ICD-10-CM

## 2024-01-30 DIAGNOSIS — M25.852 HIP IMPINGEMENT SYNDROME, LEFT: ICD-10-CM

## 2024-01-30 PROCEDURE — 97110 THERAPEUTIC EXERCISES: CPT

## 2024-01-30 PROCEDURE — 97140 MANUAL THERAPY 1/> REGIONS: CPT

## 2024-01-30 NOTE — PROGRESS NOTES
[facility]  [address] [address]  Dept: [phone]      Physical Therapy Updated Plan of Care     \"Avery\"    Referring MD: Yoandy Beckett APRN*  Diagnosis:     ICD-10-CM    1. Chronic left-sided low back pain without sciatica  M54.50     G89.29       2. Pain of left hip  M25.552       3. Joint stiffness of spine  M25.60       4. Decreased range of motion of both hips  M25.651     M25.652       5. Decreased functional mobility and endurance  Z74.09       6. Hip impingement syndrome, left  M25.852          Surgery: n/a    Therapy precautions:None  Co-morbidities affecting plan of care: Hx of B hernia repair, HTN    Chief complaints/history of injury: Patient reports long standing history of low back pain, with an original injury occurring when falling from helicopter in 2003. He notes his symptoms are mostly manageable, but he felt a tweak in the back when weightlifting in March while performing a UUE lower body lift. He has increased pain with paresthesia to the knee, occasionally to the foot, with flexed postures, especially with sitting and stair management.    PN (1/4/24)  Nature of condition: Chronic (continuous duration > 3 months)  Describe current symptoms: isolated pain deep to L anterior hip, especially in flexion    Pain Assessment:  Pain location: L anterior hip/deep  Average Pain/symptom intensity (0-10 scale)  Last 24 hours: 7/10  Last week (1-7 days): 5/10  How often do you feel symptoms? Frequently (51-75%)    How much have your symptoms interfered with daily activities? A little bit  How has your condition changed since receiving care at this facility? Much better  In general, would you say your current overall health is very good     Functional Outcome Measures: Oswestry Low Back Pain Disability Questionnaire: 19/50= 38% functional deficit, patient reports that limitations are due to anterior hip pain instead of LBP    Patient Stated Goals: return to

## 2024-04-09 ENCOUNTER — HOSPITAL ENCOUNTER (OUTPATIENT)
Dept: MRI IMAGING | Age: 49
Discharge: HOME | End: 2024-04-12
Payer: OTHER GOVERNMENT

## 2024-04-09 DIAGNOSIS — Z01.89 DIAGNOSTIC SKIN AND SENSITIZATION TESTS: ICD-10-CM

## 2024-04-09 PROCEDURE — 73721 MRI JNT OF LWR EXTRE W/O DYE: CPT

## 2024-09-23 ENCOUNTER — TELEPHONE (OUTPATIENT)
Dept: ORTHOPEDIC SURGERY | Age: 49
End: 2024-09-23

## 2024-10-11 ENCOUNTER — OFFICE VISIT (OUTPATIENT)
Age: 49
End: 2024-10-11

## 2024-10-11 ENCOUNTER — TELEPHONE (OUTPATIENT)
Dept: ORTHOPEDIC SURGERY | Age: 49
End: 2024-10-11

## 2024-10-11 DIAGNOSIS — M25.531 RIGHT WRIST PAIN: Primary | ICD-10-CM

## 2024-10-11 RX ORDER — BETAMETHASONE SODIUM PHOSPHATE AND BETAMETHASONE ACETATE 3; 3 MG/ML; MG/ML
6 INJECTION, SUSPENSION INTRA-ARTICULAR; INTRALESIONAL; INTRAMUSCULAR; SOFT TISSUE ONCE
Status: COMPLETED | OUTPATIENT
Start: 2024-10-11 | End: 2024-10-11

## 2024-10-11 RX ORDER — MELOXICAM 15 MG/1
15 TABLET ORAL DAILY
Qty: 28 TABLET | Refills: 0 | Status: SHIPPED | OUTPATIENT
Start: 2024-10-11 | End: 2024-11-08

## 2024-10-11 RX ADMIN — BETAMETHASONE SODIUM PHOSPHATE AND BETAMETHASONE ACETATE 6 MG: 3; 3 INJECTION, SUSPENSION INTRA-ARTICULAR; INTRALESIONAL; INTRAMUSCULAR; SOFT TISSUE at 08:43

## 2024-10-11 NOTE — PROGRESS NOTES
Orthopaedic Hand Clinic Note    Name: Yvan Rizzo  YOB: 1975  Gender: male  MRN: [id number]      CC: Patient referred for evaluation of right wrist pain    HPI: Yvan Rizzo is a 49 y.o. male right hand dominant with a chief complaint of right wrist pain.  He reports many injuries throughout his career in the .  He notes that his wrist has been bothering him for a couple months.  He has pain with gripping.  Most of his pain is on the ulnar side.  He has not had any medication.  [facility]  [facility].  He was referred to orthopedics for follow-up.    ROS/Meds/PSH/PMH/FH/SH: I personally reviewed the patients standard intake form.  Pertinents are discussed in the HPI    Physical Examination:  General: Awake and alert.  HEENT: Normocephalic, atraumatic  CV/Pulm: Breathing even and unlabored  Skin: No obvious rashes noted.  Lymphatic: No obvious evidence of lymphedema or lymphadenopathy    Musculoskeletal Exam:  Examination on the right upper extremity demonstrates cap refill < 5 seconds in all fingers  Patient has mild swelling over the ulnar aspect of the right wrist.  He is nontender over the distal radius and distal ulna.  He has tenderness over the TFCC.  He has pain with ulnar and radial deviation.  He has no pain with flexion and extension.  He denies paresthesia.  He has good capillary refill.    Imaging / Electrodiagnostic Tests:     X-rays include a 3 view right wrist are reviewed.  Patient has some calcification in the ulnar styloid that appears chronic.    Assessment:   1. Right wrist pain        Plan:   We discussed the diagnosis and different treatment options. We discussed observation, therapy, antiinflammatory medications and other pertinent treatment modalities.    After discussing in detail the patient elects to proceed with right TFCC injection.  We will give him a widget brace.  I will give him a short course of Mobic.  I will reassess his progress back

## 2024-10-11 NOTE — TELEPHONE ENCOUNTER
Please put in chart a letter he needs to show [facility] that [facility] was here, he can get out of Mychart ?

## 2025-01-02 ENCOUNTER — OFFICE VISIT (OUTPATIENT)
Age: 50
End: 2025-01-02
Payer: OTHER GOVERNMENT

## 2025-01-02 DIAGNOSIS — M25.531 RIGHT WRIST PAIN: Primary | ICD-10-CM

## 2025-01-02 DIAGNOSIS — S63.591A TEAR OF TRIANGULAR FIBROCARTILAGE COMPLEX (TFCC) OF RIGHT WRIST: ICD-10-CM

## 2025-01-02 PROCEDURE — 99214 OFFICE O/P EST MOD 30 MIN: CPT | Performed by: NURSE PRACTITIONER

## 2025-01-02 NOTE — PROGRESS NOTES
options. We discussed observation, therapy, antiinflammatory medications and other pertinent treatment modalities.    After discussing in detail the patient elects to proceed with MRI of the right wrist.  He has failed conservative treatment including time, rest, ice, heat, anti-inflammatory medications, brace, injection.  I will call him once Dr. Coffman has had time to review his MRI.    Overall time of this visit taking into account reviewing the chart, face to face time with the patient counseling him on his condition as well as documentation after chart was over 30 minutes.     Patient voiced accordance and understanding of the information provided and the formulated plan. All questions were answered to the patient's satisfaction during the encounter.    4 This is an undiagnosed new problem with uncertain prognosis  Treatment at this time: MRI/CT    DENILSON Zamora - CNP  Orthopaedic Surgery  01/02/25  12:36 PM

## 2025-01-09 ENCOUNTER — TELEPHONE (OUTPATIENT)
Age: 50
End: 2025-01-09

## 2025-01-09 DIAGNOSIS — M25.531 RIGHT WRIST PAIN: Primary | ICD-10-CM

## 2025-01-09 DIAGNOSIS — S63.591A TEAR OF TRIANGULAR FIBROCARTILAGE COMPLEX (TFCC) OF RIGHT WRIST: ICD-10-CM

## 2025-01-09 DIAGNOSIS — M65.90 TENOSYNOVITIS: ICD-10-CM

## 2025-01-09 DIAGNOSIS — M19.90 INFLAMMATORY ARTHROPATHY: ICD-10-CM

## 2025-01-09 NOTE — TELEPHONE ENCOUNTER
I spoke with Mr. Rizzo.  Advised that Lab order would be added to his chart. He will go tomorrow to the outpatient lab to have these completed. He will return next week for follow up appointment to go over results. He understands to call back with any other questions or concerns.

## 2025-01-09 NOTE — TELEPHONE ENCOUNTER
----- Message from DENILSON Dodge CNP sent at 1/8/2025  4:20 PM EST -----  Will you let the patient know that Dr. Coffman reviewed his MRI?  There were several areas of inflammation.  He would like to order some inflammatory labs.

## 2025-01-13 DIAGNOSIS — S63.591A TEAR OF TRIANGULAR FIBROCARTILAGE COMPLEX (TFCC) OF RIGHT WRIST: ICD-10-CM

## 2025-01-13 DIAGNOSIS — M19.90 INFLAMMATORY ARTHROPATHY: ICD-10-CM

## 2025-01-13 DIAGNOSIS — M65.90 TENOSYNOVITIS: ICD-10-CM

## 2025-01-13 DIAGNOSIS — M25.531 RIGHT WRIST PAIN: ICD-10-CM

## 2025-01-13 LAB
ALBUMIN SERPL-MCNC: 4.3 G/DL (ref 3.5–5)
ALBUMIN/GLOB SERPL: 1.5 (ref 1–1.9)
ALP SERPL-CCNC: 64 U/L (ref 40–129)
ALT SERPL-CCNC: 80 U/L (ref 8–55)
ANION GAP SERPL CALC-SCNC: 10 MMOL/L (ref 7–16)
AST SERPL-CCNC: 33 U/L (ref 15–37)
BASOPHILS # BLD: 0.06 K/UL (ref 0–0.2)
BASOPHILS NFR BLD: 1.5 % (ref 0–2)
BILIRUB SERPL-MCNC: 0.6 MG/DL (ref 0–1.2)
BUN SERPL-MCNC: 15 MG/DL (ref 6–23)
CALCIUM SERPL-MCNC: 10 MG/DL (ref 8.8–10.2)
CHLORIDE SERPL-SCNC: 103 MMOL/L (ref 98–107)
CO2 SERPL-SCNC: 29 MMOL/L (ref 20–29)
CREAT SERPL-MCNC: 0.91 MG/DL (ref 0.8–1.3)
CRP SERPL-MCNC: <0.3 MG/DL (ref 0–0.4)
DIFFERENTIAL METHOD BLD: ABNORMAL
EOSINOPHIL # BLD: 0.2 K/UL (ref 0–0.8)
EOSINOPHIL NFR BLD: 5.1 % (ref 0.5–7.8)
ERYTHROCYTE [DISTWIDTH] IN BLOOD BY AUTOMATED COUNT: 12.2 % (ref 11.9–14.6)
ERYTHROCYTE [SEDIMENTATION RATE] IN BLOOD: 2 MM/HR (ref 0–20)
GLOBULIN SER CALC-MCNC: 2.9 G/DL (ref 2.3–3.5)
GLUCOSE SERPL-MCNC: 78 MG/DL (ref 70–99)
HCT VFR BLD AUTO: 44.2 % (ref 41.1–50.3)
HGB BLD-MCNC: 14.7 G/DL (ref 13.6–17.2)
IMM GRANULOCYTES # BLD AUTO: 0.01 K/UL (ref 0–0.5)
IMM GRANULOCYTES NFR BLD AUTO: 0.3 % (ref 0–5)
LYMPHOCYTES # BLD: 1.47 K/UL (ref 0.5–4.6)
LYMPHOCYTES NFR BLD: 37.2 % (ref 13–44)
MCH RBC QN AUTO: 30.1 PG (ref 26.1–32.9)
MCHC RBC AUTO-ENTMCNC: 33.3 G/DL (ref 31.4–35)
MCV RBC AUTO: 90.4 FL (ref 82–102)
MONOCYTES # BLD: 0.21 K/UL (ref 0.1–1.3)
MONOCYTES NFR BLD: 5.3 % (ref 4–12)
NEUTS SEG # BLD: 2 K/UL (ref 1.7–8.2)
NEUTS SEG NFR BLD: 50.6 % (ref 43–78)
NRBC # BLD: 0 K/UL (ref 0–0.2)
PLATELET # BLD AUTO: 231 K/UL (ref 150–450)
PMV BLD AUTO: 9.9 FL (ref 9.4–12.3)
POTASSIUM SERPL-SCNC: 4.6 MMOL/L (ref 3.5–5.1)
PROT SERPL-MCNC: 7.2 G/DL (ref 6.3–8.2)
RBC # BLD AUTO: 4.89 M/UL (ref 4.23–5.6)
SODIUM SERPL-SCNC: 142 MMOL/L (ref 136–145)
URATE SERPL-MCNC: 5.6 MG/DL (ref 3.9–8.2)
WBC # BLD AUTO: 4 K/UL (ref 4.3–11.1)

## 2025-01-14 LAB
ANA SER QL: NEGATIVE
CCP IGA+IGG SERPL IA-ACNC: 6 UNITS (ref 0–19)
RHEUMATOID FACT SER QL LA: POSITIVE
RHEUMATOID FACT TITR SER LA: NORMAL {TITER}

## 2025-01-15 ENCOUNTER — OFFICE VISIT (OUTPATIENT)
Age: 50
End: 2025-01-15
Payer: OTHER GOVERNMENT

## 2025-01-15 DIAGNOSIS — R76.8 RHEUMATOID FACTOR POSITIVE: ICD-10-CM

## 2025-01-15 DIAGNOSIS — M65.90 TENOSYNOVITIS: ICD-10-CM

## 2025-01-15 DIAGNOSIS — M25.531 RIGHT WRIST PAIN: ICD-10-CM

## 2025-01-15 DIAGNOSIS — M19.90 INFLAMMATORY ARTHROPATHY: Primary | ICD-10-CM

## 2025-01-15 PROCEDURE — 99214 OFFICE O/P EST MOD 30 MIN: CPT | Performed by: NURSE PRACTITIONER

## 2025-01-15 NOTE — PROGRESS NOTES
tendinosis and  tenosynovitis.  2.  Trace tenosynovial fluid in the second, third and fourth extensor  compartments.  3.  Mild degenerative increased intrasubstance signal in the TFCC central disc.  No tear.  4.  Findings concerning for full-thickness, partial width tear of the proximal  component of the scapholunate ligament. The dorsal and volar components are  intact. No diastasis.  5.  Trace distal radioulnar joint effusion.  6.  Trace scapholunate joint effusion.  7.  Trace ulnocarpal joint effusion with mild synovitis.    Assessment:     ICD-10-CM    1. Inflammatory arthropathy  M19.90 Ambulatory referral to Rheumatology      2. Rheumatoid factor positive  R76.8 Ambulatory referral to Rheumatology      3. Right wrist pain  M25.531       4. Tenosynovitis  M65.90           Plan:   We discussed the diagnosis and different treatment options. We discussed observation, therapy, antiinflammatory medications and other pertinent treatment modalities.    After discussing in detail the patient elects to proceed with referral to rheumatology.  His rheumatoid factor was positive.  His MRI of his right wrist is indicative of inflammatory disease.  I will see him back as needed.     Overall time of this visit taking into account reviewing the chart, face to face time with the patient counseling him on his condition as well as documentation after chart was over 32 minutes.     Patient voiced accordance and understanding of the information provided and the formulated plan. All questions were answered to the patient's satisfaction during the encounter.    Treatment at this time:  Rheumatology referral    DENILSON Zamora - CNP  Orthopaedic Surgery  01/15/25  12:14 PM

## 2025-01-17 LAB — HLA-B27 QL NAA+PROBE: NEGATIVE

## 2025-03-04 ENCOUNTER — APPOINTMENT (OUTPATIENT)
Dept: CT IMAGING | Age: 50
End: 2025-03-04
Payer: OTHER GOVERNMENT

## 2025-03-04 ENCOUNTER — HOSPITAL ENCOUNTER (EMERGENCY)
Age: 50
Discharge: HOME | End: 2025-03-04
Payer: OTHER GOVERNMENT

## 2025-03-04 VITALS
DIASTOLIC BLOOD PRESSURE: 95 MMHG | HEIGHT: 67 IN | RESPIRATION RATE: 14 BRPM | TEMPERATURE: 97.7 F | HEART RATE: 73 BPM | SYSTOLIC BLOOD PRESSURE: 131 MMHG | WEIGHT: 170 LBS | OXYGEN SATURATION: 100 % | BODY MASS INDEX: 26.68 KG/M2

## 2025-03-04 DIAGNOSIS — S76.212A STRAIN OF GROIN, LEFT, INITIAL ENCOUNTER: Primary | ICD-10-CM

## 2025-03-04 DIAGNOSIS — K59.00 CONSTIPATION, UNSPECIFIED CONSTIPATION TYPE: ICD-10-CM

## 2025-03-04 DIAGNOSIS — R10.32 LEFT INGUINAL PAIN: ICD-10-CM

## 2025-03-04 LAB
ALBUMIN SERPL-MCNC: 4.3 G/DL (ref 3.5–5)
ALBUMIN/GLOB SERPL: 1.2 (ref 1–1.9)
ALP SERPL-CCNC: 73 U/L (ref 40–129)
ALT SERPL-CCNC: 79 U/L (ref 8–55)
ANION GAP SERPL CALC-SCNC: 12 MMOL/L (ref 7–16)
APPEARANCE UR: CLEAR
AST SERPL-CCNC: 36 U/L (ref 15–37)
BASOPHILS # BLD: 0.05 K/UL (ref 0–0.2)
BASOPHILS NFR BLD: 1.3 % (ref 0–2)
BILIRUB SERPL-MCNC: 0.5 MG/DL (ref 0–1.2)
BILIRUB UR QL: NEGATIVE
BUN SERPL-MCNC: 15 MG/DL (ref 6–23)
CALCIUM SERPL-MCNC: 9.8 MG/DL (ref 8.8–10.2)
CHLORIDE SERPL-SCNC: 98 MMOL/L (ref 98–107)
CO2 SERPL-SCNC: 28 MMOL/L (ref 20–29)
COLOR UR: NORMAL
CREAT SERPL-MCNC: 1.03 MG/DL (ref 0.8–1.3)
DIFFERENTIAL METHOD BLD: ABNORMAL
EOSINOPHIL # BLD: 0.14 K/UL (ref 0–0.8)
EOSINOPHIL NFR BLD: 3.6 % (ref 0.5–7.8)
ERYTHROCYTE [DISTWIDTH] IN BLOOD BY AUTOMATED COUNT: 12.1 % (ref 11.9–14.6)
GLOBULIN SER CALC-MCNC: 3.5 G/DL (ref 2.3–3.5)
GLUCOSE SERPL-MCNC: 107 MG/DL (ref 70–99)
GLUCOSE UR STRIP.AUTO-MCNC: NEGATIVE MG/DL
HCT VFR BLD AUTO: 44.4 % (ref 41.1–50.3)
HGB BLD-MCNC: 14.8 G/DL (ref 13.6–17.2)
HGB UR QL STRIP: NEGATIVE
IMM GRANULOCYTES # BLD AUTO: 0.01 K/UL (ref 0–0.5)
IMM GRANULOCYTES NFR BLD AUTO: 0.3 % (ref 0–5)
KETONES UR QL STRIP.AUTO: NEGATIVE MG/DL
LEUKOCYTE ESTERASE UR QL STRIP.AUTO: NEGATIVE
LYMPHOCYTES # BLD: 1.71 K/UL (ref 0.5–4.6)
LYMPHOCYTES NFR BLD: 44.2 % (ref 13–44)
MAGNESIUM SERPL-MCNC: 2.4 MG/DL (ref 1.8–2.4)
MCH RBC QN AUTO: 29.2 PG (ref 26.1–32.9)
MCHC RBC AUTO-ENTMCNC: 33.3 G/DL (ref 31.4–35)
MCV RBC AUTO: 87.7 FL (ref 82–102)
MONOCYTES # BLD: 0.24 K/UL (ref 0.1–1.3)
MONOCYTES NFR BLD: 6.2 % (ref 4–12)
NEUTS SEG # BLD: 1.72 K/UL (ref 1.7–8.2)
NEUTS SEG NFR BLD: 44.4 % (ref 43–78)
NITRITE UR QL STRIP.AUTO: NEGATIVE
NRBC # BLD: 0 K/UL (ref 0–0.2)
PH UR STRIP: 6.5 (ref 5–9)
PLATELET # BLD AUTO: 228 K/UL (ref 150–450)
PMV BLD AUTO: 9 FL (ref 9.4–12.3)
POTASSIUM SERPL-SCNC: 4.4 MMOL/L (ref 3.5–5.1)
PROT SERPL-MCNC: 7.8 G/DL (ref 6.3–8.2)
PROT UR STRIP-MCNC: NEGATIVE MG/DL
RBC # BLD AUTO: 5.06 M/UL (ref 4.23–5.6)
SODIUM SERPL-SCNC: 138 MMOL/L (ref 136–145)
SP GR UR REFRACTOMETRY: 1.01 (ref 1–1.02)
UROBILINOGEN UR QL STRIP.AUTO: 0.2 EU/DL (ref 0.2–1)
WBC # BLD AUTO: 3.9 K/UL (ref 4.3–11.1)

## 2025-03-04 PROCEDURE — 6360000004 HC RX CONTRAST MEDICATION: Performed by: NURSE PRACTITIONER

## 2025-03-04 PROCEDURE — 85025 COMPLETE CBC W/AUTO DIFF WBC: CPT

## 2025-03-04 PROCEDURE — 80053 COMPREHEN METABOLIC PANEL: CPT

## 2025-03-04 PROCEDURE — 74177 CT ABD & PELVIS W/CONTRAST: CPT

## 2025-03-04 PROCEDURE — 81003 URINALYSIS AUTO W/O SCOPE: CPT

## 2025-03-04 PROCEDURE — 96375 TX/PRO/DX INJ NEW DRUG ADDON: CPT

## 2025-03-04 PROCEDURE — 96374 THER/PROPH/DIAG INJ IV PUSH: CPT

## 2025-03-04 PROCEDURE — 99285 EMERGENCY DEPT VISIT HI MDM: CPT

## 2025-03-04 PROCEDURE — 83735 ASSAY OF MAGNESIUM: CPT

## 2025-03-04 PROCEDURE — 6360000002 HC RX W HCPCS: Performed by: NURSE PRACTITIONER

## 2025-03-04 RX ORDER — KETOROLAC TROMETHAMINE 15 MG/ML
15 INJECTION, SOLUTION INTRAMUSCULAR; INTRAVENOUS ONCE
Status: COMPLETED | OUTPATIENT
Start: 2025-03-04 | End: 2025-03-04

## 2025-03-04 RX ORDER — TIZANIDINE 2 MG/1
4 TABLET ORAL EVERY 6 HOURS PRN
Status: DISCONTINUED | OUTPATIENT
Start: 2025-03-04 | End: 2025-03-04

## 2025-03-04 RX ORDER — ONDANSETRON 2 MG/ML
4 INJECTION INTRAMUSCULAR; INTRAVENOUS ONCE
Status: COMPLETED | OUTPATIENT
Start: 2025-03-04 | End: 2025-03-04

## 2025-03-04 RX ORDER — KETOROLAC TROMETHAMINE 10 MG/1
10 TABLET, FILM COATED ORAL EVERY 6 HOURS PRN
Qty: 10 TABLET | Refills: 0 | Status: SHIPPED | OUTPATIENT
Start: 2025-03-04

## 2025-03-04 RX ADMIN — ONDANSETRON 4 MG: 2 INJECTION, SOLUTION INTRAMUSCULAR; INTRAVENOUS at 16:41

## 2025-03-04 RX ADMIN — IOHEXOL 100 ML: 350 INJECTION, SOLUTION INTRAVENOUS at 17:17

## 2025-03-04 RX ADMIN — KETOROLAC TROMETHAMINE 15 MG: 15 INJECTION, SOLUTION INTRAMUSCULAR; INTRAVENOUS at 16:41

## 2025-03-04 ASSESSMENT — PAIN SCALES - GENERAL
PAINLEVEL_OUTOF10: 8
PAINLEVEL_OUTOF10: 8

## 2025-03-04 ASSESSMENT — PAIN - FUNCTIONAL ASSESSMENT: PAIN_FUNCTIONAL_ASSESSMENT: 0-10

## 2025-03-04 NOTE — ED TRIAGE NOTES
Patient reports left groin pain x 2 years, worse in last 3 days.    pt presents to ED with c/o catheter complications. His urologist called and asked Dr. Colletta to place a indwelling catheter in the patient and have the pt f/u with urology

## 2025-03-04 NOTE — ED PROVIDER NOTES
Significant amount of stool throughout the large bowel including the cecum  through to the level of the rectum as seen on the previous evaluation post  herniorrhaphy changes with a mesh reconstruction on the right inguinal region  similar to previous. No acute inflammatory changes in the intra abdominal  compartment.      If providers have any questions about this report, I can be reached on  [employer].      Electronically signed by Jose G Lya   CBC with Auto Differential   Result Value Ref Range    WBC 3.9 (L) 4.3 - 11.1 K/uL    RBC 5.06 4.23 - 5.6 M/uL    Hemoglobin 14.8 13.6 - 17.2 g/dL    Hematocrit 44.4 41.1 - 50.3 %    MCV 87.7 82.0 - 102.0 FL    MCH 29.2 26.1 - 32.9 PG    MCHC 33.3 31.4 - 35.0 g/dL    RDW 12.1 11.9 - 14.6 %    Platelets 228 150 - 450 K/uL    MPV 9.0 (L) 9.4 - 12.3 FL    nRBC 0.00 0.0 - 0.2 K/uL    Differential Type AUTOMATED      Neutrophils % 44.4 43.0 - 78.0 %    Lymphocytes % 44.2 (H) 13.0 - 44.0 %    Monocytes % 6.2 4.0 - 12.0 %    Eosinophils % 3.6 0.5 - 7.8 %    Basophils % 1.3 0.0 - 2.0 %    Immature Granulocytes % 0.3 0.0 - 5.0 %    Neutrophils Absolute 1.72 1.70 - 8.20 K/UL    Lymphocytes Absolute 1.71 0.50 - 4.60 K/UL    Monocytes Absolute 0.24 0.10 - 1.30 K/UL    Eosinophils Absolute 0.14 0.00 - 0.80 K/UL    Basophils Absolute 0.05 0.00 - 0.20 K/UL    Immature Granulocytes Absolute 0.01 0.0 - 0.5 K/UL   CMP   Result Value Ref Range    Sodium 138 136 - 145 mmol/L    Potassium 4.4 3.5 - 5.1 mmol/L    Chloride 98 98 - 107 mmol/L    CO2 28 20 - 29 mmol/L    Anion Gap 12 7 - 16 mmol/L    Glucose 107 (H) 70 - 99 mg/dL    BUN 15 6 - 23 MG/DL    Creatinine 1.03 0.80 - 1.30 MG/DL    Est, Glom Filt Rate >90 >60 ml/min/1.73m2    Calcium 9.8 8.8 - 10.2 MG/DL    Total Bilirubin 0.5 0.0 - 1.2 MG/DL    ALT 79 (H) 8 - 55 U/L    AST 36 15 - 37 U/L    Alk Phosphatase 73 40 - 129 U/L    Total Protein 7.8 6.3 - 8.2 g/dL    Albumin 4.3 3.5 - 5.0 g/dL    Globulin 3.5 2.3 - 3.5 g/dL

## 2025-03-05 NOTE — DISCHARGE INSTRUCTIONS
Rest is much as possible  Ice to the area 20 minutes each hour while awake for the first 48 hours and then 4-6 times daily as needed for any pain or swelling  Use the ketorolac 10 mg you can take 1 tablet every 6-8 hours if needed for pain.  Take this with food.  Do not mix this with any other anti-inflammatories like ibuprofen naproxen or diclofenac.  Tizanidine take 4 mg you can take this every 8 hours as needed for any muscle spasm  You have quite a bit of constipation shown on the CT scan.  Make sure to  MiraLAX over-the-counter.  You can use 17 g into 4 ounces of juice or water.  You will do this twice daily and make sure to use 64 to 80 ounces of clear fluids throughout the day to keep yourself well-hydrated.  And this will help the bowels moved.  Once you have 3 normal bowel movements in a row.  You can stop the MiraLAX and only use it as needed  Make sure to follow-up with your primary care this week for recheck exam  Return to the ER if worse

## 2025-04-08 ASSESSMENT — RHEUMATOLOGY NEW PATIENT QUESTIONNAIRE
HOW WOULD YOU DESCRIBE YOUR STIFFNESS ON AVERAGE: SEVERE
AGITATION: Y
SHORTNESS OF BREATH: N
DIFFICULTY SWALLOWING: N
EASY BRUISING: N
ANXIETY: Y
SEIZURES: N
NODULES/BUMPS: N
BEHAVIORAL CHANGES: Y
DIFFICULTY BREATHING LYING DOWN: Y
SUN SENSITIVE (SUN ALLERGY): Y
MORNING STIFFNESS: Y
NAUSEA: N
ABNORMAL URINE: Y
CHEST PAIN: N
MUSCLE WEAKNESS: Y
UNUSUALLY RAPID OR SLOWED HEART RATE: N
EYE PAIN: N
COUGH: N
UNUSUAL BLEEDING: N
EYE DRYNESS: Y
FAINTING: N
NIGHT SWEATS: N
SWOLLEN LEGS OR FEET: N
PAIN OR BURNING ON URINATION: N
SWOLLEN OR TENDER GLANDS: N
BLACK STOOLS: N
COLOR CHANGES OF HANDS OR FEET IN THE COLD: N
JOINT PAIN: Y
BLOOD IN STOOLS: N
DIFFICULTY FALLING ASLEEP: Y
UNEXPLAINED WEIGHT CHANGE: N
DEPRESSION: Y
EXCESSIVE HAIR LOSS (MORE THAN YOUR NORM): N
HEARTBURN OR REFLUX: Y
RASH OR ULCERS: N
SKIN REDNESS: N
DOUBLE OR BLURRED VISION: N
RASH: N
LOSS OF CONSCIOUSNESS: N
DRYNESS OF MOUTH: N
FEVER: N
HOARSE VOICE: N
HEADACHES: Y
SKIN TIGHTNESS: Y
ANEMIA: N
INCREASED SUSCEPTIBILITY TO INFECTION: N
VOMITING OF BLOOD OR COFFEE GROUND CONSISTENCY MATERIAL: N
UNUSUAL FATIGUE: N
SORES IN MOUTH OR NOSE: N
MORNING STIFFNESS IN LOWER BACK: Y
MEMORY LOSS: Y
EASILY LOSING TEMPER: Y
EYE REDNESS: N
PERSISTENT DIARRHEA: N
UNEXPLAINED HEARING LOSS: N
STOMACH PAIN: N
JOINT SWELLING: Y
NUMBNESS OR TINGLING IN HANDS OR FEET: N
LOSS OF VISION: Y
DIFFICULTY STAYING ASLEEP: Y
JAUNDICE: N

## 2025-04-11 ENCOUNTER — OFFICE VISIT (OUTPATIENT)
Dept: RHEUMATOLOGY | Age: 50
End: 2025-04-11

## 2025-04-11 ENCOUNTER — HOSPITAL ENCOUNTER (OUTPATIENT)
Dept: GENERAL RADIOLOGY | Age: 50
Discharge: HOME | End: 2025-04-14
Payer: OTHER GOVERNMENT

## 2025-04-11 VITALS
BODY MASS INDEX: 27.4 KG/M2 | HEART RATE: 60 BPM | SYSTOLIC BLOOD PRESSURE: 108 MMHG | HEIGHT: 67 IN | DIASTOLIC BLOOD PRESSURE: 70 MMHG | OXYGEN SATURATION: 97 % | WEIGHT: 174.6 LBS

## 2025-04-11 DIAGNOSIS — R93.7: ICD-10-CM

## 2025-04-11 DIAGNOSIS — M05.80 POLYARTHRITIS WITH POSITIVE RHEUMATOID FACTOR (HCC): Primary | ICD-10-CM

## 2025-04-11 DIAGNOSIS — M05.80 POLYARTHRITIS WITH POSITIVE RHEUMATOID FACTOR (HCC): ICD-10-CM

## 2025-04-11 PROCEDURE — 73130 X-RAY EXAM OF HAND: CPT

## 2025-04-11 PROCEDURE — 73630 X-RAY EXAM OF FOOT: CPT

## 2025-04-11 RX ORDER — METHOCARBAMOL 750 MG/1
750 TABLET, FILM COATED ORAL 3 TIMES DAILY PRN
COMMUNITY

## 2025-04-11 RX ORDER — IBUPROFEN 400 MG/1
400 TABLET, FILM COATED ORAL EVERY 6 HOURS PRN
COMMUNITY

## 2025-04-11 RX ORDER — LIDOCAINE 40 MG/G
CREAM TOPICAL 4 TIMES DAILY PRN
COMMUNITY

## 2025-04-11 RX ORDER — CETIRIZINE HYDROCHLORIDE 10 MG/1
10 TABLET ORAL NIGHTLY
COMMUNITY

## 2025-04-11 RX ORDER — KETOTIFEN FUMARATE 0.35 MG/ML
1 SOLUTION/ DROPS OPHTHALMIC 2 TIMES DAILY
COMMUNITY

## 2025-04-11 RX ORDER — BUSPIRONE HYDROCHLORIDE 15 MG/1
15 TABLET ORAL 4 TIMES DAILY
COMMUNITY

## 2025-04-11 RX ORDER — PREDNISONE 20 MG/1
TABLET ORAL
Qty: 21 TABLET | Refills: 0 | Status: SHIPPED | OUTPATIENT
Start: 2025-04-11

## 2025-04-11 RX ORDER — PANTOPRAZOLE SODIUM 40 MG/1
40 TABLET, DELAYED RELEASE ORAL 2 TIMES DAILY
COMMUNITY

## 2025-04-11 ASSESSMENT — ROUTINE ASSESSMENT OF PATIENT INDEX DATA (RAPID3)
ON A SCALE OF ONE TO TEN, CONSIDERING ALL THE WAYS IN WHICH ILLNESS AND HEALTH CONDITIONS MAY AFFECT YOU AT THIS TIME, PLEASE INDICATE BELOW HOW YOU ARE DOING:: 7
ON A SCALE OF ONE TO TEN, HOW MUCH OF A PROBLEM HAS UNUSUAL FATIGUE OR TIREDNESS BEEN FOR YOU OVER THE PAST WEEK?: 5
ON A SCALE OF ONE TO TEN, HOW DIFFICULT WAS IT FOR YOU TO COMPLETE THE LISTED DAILY PHYSICAL TASKS OVER THE LAST WEEK: 1.3
WHEN YOU AWAKENED IN THE MORNING OVER THE LAST WEEK, PLEASE INDICATE THE AMOUNT OF TIME IT TAKES UNTIL YOU ARE AS LIMBER AS YOU WILL BE FOR THE DAY: 1 HOUR
ON A SCALE OF ONE TO TEN, HOW MUCH PAIN HAVE YOU HAD BECAUSE OF YOUR CONDITION OVER THE PAST WEEK?: 7

## 2025-04-11 ASSESSMENT — JOINT PAIN
TOTAL NUMBER OF SWOLLEN JOINTS: 0
TOTAL NUMBER OF TENDER JOINTS: 24

## 2025-04-11 NOTE — PROGRESS NOTES
feeling \"poor\".    Pain: 7/10  Location:  Some pain and swelling involving the MCP and PIP joints of both the hands. 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 Bilateral wrist pain with no swelling with no overlying warmth and redness. Bilateral elbow and shoulder pain.  Some neck stiffness and occasional tension headaches with a history of migraines. Some mid back and shoulder blade pain with some lower back pain.  Bilateral hip pain with some groin pain.  Has had bilateral knee pain with buckling of the left knee. No buckling of the right knee. Bilateral ankle pain with no swelling with buckling worse in the left ankle than the right ankle. Some pain involving the planter aspect of his feet as well as the dorsum with swelling on the dorsum with no warmth and redness though.    Quality:  Sharp pain.   Modifying Factors:  1st thing in the morning the stiffness is the worst and pain is the worst at the end of the day  Associated Symptoms:  Intermittent positional tingling and numbness down the arms from the shoulders to the fingertips with no pain down the arms. Constant pain with intermittent tingling and numbness from the left hip to the left knee. No pain, tingling and numbness down the right leg. Some leg weakness with no upper extremity weakness.        4/11/2025     7:00 AM   DMARD/Biologic   AM Stiffness 1 hour   Pain 7   Fatigue 5   MDHAQ 1.3   Patient Global Score 7     Denies any malar rash,  no oral ulcers. Some dry eyes and is on prescription eye drops for it. No dry mouth with no dysphagia. Denies any h/o DVT's or  PE's. No h/o thyroid problems, no h/o anemia.    History Reviewed:    Past Medical History  Past Medical History:   Diagnosis Date    Low back pain     Osteoarthritis [id number]    Rheumatoid arthritis (HCC)     Substance abuse [id number]    Tendinitis        Past Surgical History  Past Surgical History:   Procedure Laterality Date    APPENDECTOMY

## 2025-05-16 ENCOUNTER — OFFICE VISIT (OUTPATIENT)
Dept: RHEUMATOLOGY | Age: 50
End: 2025-05-16
Payer: OTHER GOVERNMENT

## 2025-05-16 VITALS
SYSTOLIC BLOOD PRESSURE: 120 MMHG | DIASTOLIC BLOOD PRESSURE: 82 MMHG | HEART RATE: 70 BPM | OXYGEN SATURATION: 97 % | HEIGHT: 67 IN | BODY MASS INDEX: 26.65 KG/M2 | WEIGHT: 169.8 LBS

## 2025-05-16 DIAGNOSIS — M05.79 SEROPOSITIVE RHEUMATOID ARTHRITIS OF MULTIPLE SITES (HCC): Primary | ICD-10-CM

## 2025-05-16 DIAGNOSIS — Z71.2 ENCOUNTER TO DISCUSS X-RAY RESULTS: ICD-10-CM

## 2025-05-16 PROCEDURE — 99215 OFFICE O/P EST HI 40 MIN: CPT | Performed by: INTERNAL MEDICINE

## 2025-05-16 RX ORDER — HYDROXYCHLOROQUINE SULFATE 200 MG/1
TABLET, FILM COATED ORAL
Qty: 180 TABLET | Refills: 0 | Status: SHIPPED | OUTPATIENT
Start: 2025-05-16

## 2025-05-16 ASSESSMENT — ROUTINE ASSESSMENT OF PATIENT INDEX DATA (RAPID3)
ON A SCALE OF ONE TO TEN, HOW MUCH OF A PROBLEM HAS UNUSUAL FATIGUE OR TIREDNESS BEEN FOR YOU OVER THE PAST WEEK?: 6
ON A SCALE OF ONE TO TEN, HOW MUCH PAIN HAVE YOU HAD BECAUSE OF YOUR CONDITION OVER THE PAST WEEK?: 7
ON A SCALE OF ONE TO TEN, HOW DIFFICULT WAS IT FOR YOU TO COMPLETE THE LISTED DAILY PHYSICAL TASKS OVER THE LAST WEEK: 1.6
WHEN YOU AWAKENED IN THE MORNING OVER THE LAST WEEK, PLEASE INDICATE THE AMOUNT OF TIME IT TAKES UNTIL YOU ARE AS LIMBER AS YOU WILL BE FOR THE DAY: 1 HOUR
ON A SCALE OF ONE TO TEN, CONSIDERING ALL THE WAYS IN WHICH ILLNESS AND HEALTH CONDITIONS MAY AFFECT YOU AT THIS TIME, PLEASE INDICATE BELOW HOW YOU ARE DOING:: 7

## 2025-05-16 NOTE — PROGRESS NOTES
[facility]  Antoni Cash M.D.  [address] , [address]   [facility]  Office : [phone], Fax: [phone]     RHEUMATOLOGY OFFICE VISIT NOTE  Date of Visit:  2025 8:33 AM    Patient Information:  Name:  Yvan Rizzo  :  1975  Age:  49 y.o.   Gender:  male      Mr. Rizzo is here today for follow-up of polyarthritis with a positive rheumatoid factor (HCC) and review x-ray results from the last visit.     Last visit: 25    History of Present Illness: On talking to the patient today he states that he continues to have joint pain but the burst and taper of prednisone that he was started on by me did ease some of the pain but now that he is off the prednisone the pain has returned.  Patient's current joint complaints are as mentioned below.    Since the last visit, patient is feeling \"poor\".    Pain: 7/10  Location: Bilateral wrist and MCP joints of the hands with swelling with no warmth and redness. Bilateral thumb pain with some swelling with no warmth and redness.  Bilateral shoulder and elbow pain with no swelling, warmth and redness of the elbows. Some neck stiffness with some pain with neck ROM. Occasional migraines. Some para spinal muscle pain. Some lower back pain. Some left hip and groin pain. Bilateral knee pain with swelling of the left knee with no swelling of the right knee. Occasional buckling of the left knee. Bilateral ankle swelling with no warmth and redness with no pain. Some pain on the planter aspect of his feet.   Quality:   Sharp pain.   Modifying Factors:  1st thing in the morning the stiffness is the worst and pain is the worst at the end of the day.  Associated Symptoms:  No limitations with his ADL's.  No tingling, numbness or pain down the arms or legs. No UE some left LE weakness with no UE weakness.         2025     8:00 AM   DMARD/Biologic   AM Stiffness 1 hour   Pain 7   Fatigue 6   MDHAQ 1.6   Patient

## 2025-09-02 ENCOUNTER — OFFICE VISIT (OUTPATIENT)
Dept: RHEUMATOLOGY | Age: 50
End: 2025-09-02
Payer: OTHER GOVERNMENT

## 2025-09-02 VITALS
SYSTOLIC BLOOD PRESSURE: 100 MMHG | OXYGEN SATURATION: 95 % | HEART RATE: 58 BPM | DIASTOLIC BLOOD PRESSURE: 80 MMHG | WEIGHT: 171.6 LBS | HEIGHT: 67 IN | BODY MASS INDEX: 26.93 KG/M2

## 2025-09-02 DIAGNOSIS — G89.29 CHRONIC BILATERAL LOW BACK PAIN WITH LEFT-SIDED SCIATICA: ICD-10-CM

## 2025-09-02 DIAGNOSIS — M54.42 CHRONIC BILATERAL LOW BACK PAIN WITH LEFT-SIDED SCIATICA: ICD-10-CM

## 2025-09-02 DIAGNOSIS — Z79.899 LONG-TERM USE OF HIGH-RISK MEDICATION: ICD-10-CM

## 2025-09-02 DIAGNOSIS — M05.79 SEROPOSITIVE RHEUMATOID ARTHRITIS OF MULTIPLE SITES (HCC): Primary | ICD-10-CM

## 2025-09-02 PROCEDURE — 99214 OFFICE O/P EST MOD 30 MIN: CPT | Performed by: INTERNAL MEDICINE

## 2025-09-02 RX ORDER — HYDROXYCHLOROQUINE SULFATE 200 MG/1
TABLET, FILM COATED ORAL
Qty: 180 TABLET | Refills: 1 | Status: SHIPPED | OUTPATIENT
Start: 2025-09-02

## 2025-09-02 ASSESSMENT — ROUTINE ASSESSMENT OF PATIENT INDEX DATA (RAPID3)
ON A SCALE OF ONE TO TEN, HOW MUCH OF A PROBLEM HAS UNUSUAL FATIGUE OR TIREDNESS BEEN FOR YOU OVER THE PAST WEEK?: 6
ON A SCALE OF ONE TO TEN, HOW DIFFICULT WAS IT FOR YOU TO COMPLETE THE LISTED DAILY PHYSICAL TASKS OVER THE LAST WEEK: 1.3
WHEN YOU AWAKENED IN THE MORNING OVER THE LAST WEEK, PLEASE INDICATE THE AMOUNT OF TIME IT TAKES UNTIL YOU ARE AS LIMBER AS YOU WILL BE FOR THE DAY: 1 HOUR
ON A SCALE OF ONE TO TEN, CONSIDERING ALL THE WAYS IN WHICH ILLNESS AND HEALTH CONDITIONS MAY AFFECT YOU AT THIS TIME, PLEASE INDICATE BELOW HOW YOU ARE DOING:: 7
ON A SCALE OF ONE TO TEN, HOW MUCH PAIN HAVE YOU HAD BECAUSE OF YOUR CONDITION OVER THE PAST WEEK?: 7

## 2025-09-02 ASSESSMENT — JOINT PAIN
TOTAL NUMBER OF SWOLLEN JOINTS: 0
TOTAL NUMBER OF TENDER JOINTS: 9